# Patient Record
Sex: FEMALE | Race: WHITE | NOT HISPANIC OR LATINO | Employment: FULL TIME | URBAN - NONMETROPOLITAN AREA
[De-identification: names, ages, dates, MRNs, and addresses within clinical notes are randomized per-mention and may not be internally consistent; named-entity substitution may affect disease eponyms.]

---

## 2023-01-28 ENCOUNTER — APPOINTMENT (OUTPATIENT)
Dept: RADIOLOGY | Facility: CLINIC | Age: 53
End: 2023-01-28

## 2023-01-28 ENCOUNTER — OFFICE VISIT (OUTPATIENT)
Dept: URGENT CARE | Facility: CLINIC | Age: 53
End: 2023-01-28

## 2023-01-28 VITALS
DIASTOLIC BLOOD PRESSURE: 76 MMHG | WEIGHT: 132 LBS | HEIGHT: 62 IN | SYSTOLIC BLOOD PRESSURE: 112 MMHG | RESPIRATION RATE: 18 BRPM | OXYGEN SATURATION: 99 % | BODY MASS INDEX: 24.29 KG/M2 | HEART RATE: 100 BPM | TEMPERATURE: 97.7 F

## 2023-01-28 DIAGNOSIS — M77.8 LEFT HAND TENDONITIS: Primary | ICD-10-CM

## 2023-01-28 DIAGNOSIS — M79.642 LEFT HAND PAIN: ICD-10-CM

## 2023-01-28 RX ORDER — PANTOPRAZOLE SODIUM 40 MG/1
40 TABLET, DELAYED RELEASE ORAL DAILY
COMMUNITY
Start: 2022-11-18

## 2023-01-28 RX ORDER — RIMEGEPANT SULFATE 75 MG/75MG
75 TABLET, ORALLY DISINTEGRATING ORAL
COMMUNITY
Start: 2023-01-10

## 2023-01-28 RX ORDER — SUCRALFATE 1 G/1
1 TABLET ORAL 4 TIMES DAILY
COMMUNITY
Start: 2023-01-15

## 2023-01-28 RX ORDER — AMITRIPTYLINE HYDROCHLORIDE 75 MG/1
75 TABLET, FILM COATED ORAL EVERY EVENING
COMMUNITY
Start: 2022-11-18

## 2023-01-28 RX ORDER — DIPHENOXYLATE HYDROCHLORIDE AND ATROPINE SULFATE 2.5; .025 MG/1; MG/1
1 TABLET ORAL DAILY
COMMUNITY

## 2023-01-28 NOTE — PATIENT INSTRUCTIONS
Tylenol or ibuprofen for pain  Wear brace as instructed  Apply ice 4 times per day for at least 15 to 20 minutes  Use insulation to avoid frostbite  Place 1-2 pillows under left wrist/hand when resting for elevation  Follow up with PT as instructed  Follow up with PCP in 3-5 days  Proceed to  ER if symptoms worsen  Tendinitis   WHAT YOU NEED TO KNOW:   Tendinitis is painful inflammation or breakdown of your tendons  It may also be called tendinopathy  Tendinitis often occurs in the knee, shoulder, ankle, hip, or elbow  DISCHARGE INSTRUCTIONS:   Medicines:   Pain medicines  such as acetaminophen or NSAIDs may decrease swelling and pain or fever  These medicines are available without a doctor's order  Ask which medicine to take  Ask how much to take and when to take it  Follow directions  Acetaminophen and NSAIDs can cause liver or kidney damage if not taken correctly  If you take blood thinner medicine, always ask your healthcare provider if NSAIDs are safe for you  Always read the medicine label and follow the directions on it before using these medicine  Take your medicine as directed  Contact your healthcare provider if you think your medicine is not helping or if you have side effects  Tell him if you are allergic to any medicine  Keep a list of the medicines, vitamins, and herbs you take  Include the amounts, and when and why you take them  Bring the list or the pill bottles to follow-up visits  Carry your medicine list with you in case of an emergency  Management:   Rest  your tendon as directed to help it heal  Ask your healthcare provider if you need to stop putting weight on your affected area  Ice  helps decrease swelling and pain  Ice may also help prevent tissue damage  Use an ice pack, or put crushed ice in a plastic bag  Cover it with a towel and place it on the affected area for 10 to 15 minutes every hour or as directed      Support devices  such as a cane, splint, shoe insert, or brace may help reduce your pain  Physical therapy  may be ordered by your healthcare provider  This may be used to teach you exercises to help improve movement and strength, and to decrease pain  You may also learn how to improve your posture, and how to lift or exercise correctly  Prevention:   Stretch and warm up  before you exercise  Exercise regularly  to strengthen the muscles around your joint  Ease into an exercise routine for the first 3 weeks to prevent another injury  Ask your healthcare provider about the best exercise plan for you  Rest fully between activities  Use the right equipment  for sports and exercise  Wear braces or tape around weak joints as directed  Follow up with your healthcare provider as directed:  Write down your questions so you remember to ask them during your visits  Contact your healthcare provider if:   You have increased pain even after you take medicine  You have questions or concerns about your condition or care  Return to the emergency department if:   You have increased redness over the joint, or swelling in the joint  You suddenly cannot move your joint  © Copyright [employer] 2022 Information is for End User's use only and may not be sold, redistributed or otherwise used for commercial purposes  All illustrations and images included in CareNotes® are the copyrighted property of [employer]  or [facility] Christel Torres   The above information is an  only  It is not intended as medical advice for individual conditions or treatments  Talk to your doctor, nurse or pharmacist before following any medical regimen to see if it is safe and effective for you

## 2023-01-28 NOTE — PROGRESS NOTES
[facility] [facility]        NAME: Betty Flores is a 46 y o  female  : 1970    MRN: [id number]  DATE: 2023  TIME: 12:51 PM    Assessment and Plan   Left hand tendonitis [M77 8]  1  Left hand tendonitis  XR hand 3+ vw left    Ambulatory Referral to PT/OT Hand Therapy        X-ray did not reveal any obvious fractures  Official radiology reading pending at this time  If read changes, will reach out to patient  We will give her a wrist brace in the clinic today  Referring to PT/OT hand therapy  Advised patient that they will reach out to her  Advised to return or go to the ER symptoms worsen  Vies to follow-up with her PCP  Patient Instructions     Tylenol or ibuprofen for pain  Wear brace as instructed  Apply ice 4 times per day for at least 15 to 20 minutes  Use insulation to avoid frostbite  Place 1-2 pillows under left wrist/hand when resting for elevation  Follow up with PT as instructed  Follow up with PCP in 3-5 days  Proceed to  ER if symptoms worsen  Chief Complaint     Chief Complaint   Patient presents with   • Wrist Pain     Started about a month ago, denies any falls or trauma to area of left wrist  Pain is intermittent, awakens at night, sharp pain 6/10 radiates to upper forearm  Has taken otc tylenol and placed ice on it with minor relief  Active ADL, able to move hand and fingers with pain, worsens with activity  History of Present Illness       49-year-old female presents with left posterior hand pain that runs from the left thumb off of the middle left forearm  Symptoms began about a month ago  She denies any falls or trauma  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  History of carpal tunnel surgery on the left  Patient admits to some numbness/tingling in the left middle finger and left thumb  Pain is sharp and rated 6 out of 10  Tried Tylenol and ice with minimal relief  Pain with moving fingers    Denies any fever, chills, chest pain, shortness of breath, abdominal pain  Review of Systems   Review of Systems   Constitutional: Negative  HENT: Negative  Respiratory: Negative  Cardiovascular: Negative  Gastrointestinal: Negative  Musculoskeletal: Positive for arthralgias and joint swelling  Skin: Negative  Neurological: Negative  Current Medications       Current Outpatient Medications:   •  amitriptyline (ELAVIL) 75 mg tablet, Take 75 mg by mouth every evening, Disp: , Rfl:   •  Cholecalciferol 25 MCG (1000 UT) capsule, Take 1,000 Units by mouth daily, Disp: , Rfl:   •  cyanocobalamin (VITAMIN B-12) 1000 MCG tablet, Take by mouth, Disp: , Rfl:   •  fremanezumab-vfrm (Ajovy) 225 MG/1 5ML prefilled syringe, Inject 675 mg under the skin, Disp: , Rfl:   •  multivitamin (THERAGRAN) TABS, Take 1 tablet by mouth daily, Disp: , Rfl:   •  pantoprazole (PROTONIX) 40 mg tablet, Take 40 mg by mouth daily, Disp: , Rfl:   •  Rimegepant Sulfate (Nurtec) 75 MG TBDP, Take 75 mg by mouth, Disp: , Rfl:   •  sucralfate (CARAFATE) 1 g tablet, Take 1 g by mouth 4 (four) times a day, Disp: , Rfl:     Current Allergies     Allergies as of 01/28/2023 - Reviewed 01/28/2023   Allergen Reaction Noted   • Cefaclor Hives 03/22/2005   • Cephalexin Hives 03/22/2005   • Penicillins Hives 03/22/2005            The following portions of the patient's history were reviewed and updated as appropriate: allergies, current medications, past family history, past medical history, past social history, past surgical history and problem list      Past Medical History:   Diagnosis Date   • GERD (gastroesophageal reflux disease)    • Migraine        Past Surgical History:   Procedure Laterality Date   • ARTHROSCOPIC REPAIR ACL Right    • CHOLECYSTECTOMY     • TUBAL LIGATION N/A        No family history on file  Medications have been verified          Objective   /76   Pulse 100   Temp 97 7 °F (36 5 °C)   Resp 18   Ht 5' 2" (1 575 m) Wt 59 9 kg (132 lb)   SpO2 99%   BMI 24 14 kg/m²        Physical Exam     Physical Exam  Constitutional:       General: She is not in acute distress  Appearance: Normal appearance  HENT:      Head: Normocephalic and atraumatic  Eyes:      Extraocular Movements: Extraocular movements intact  Pupils: Pupils are equal, round, and reactive to light  Cardiovascular:      Rate and Rhythm: Normal rate and regular rhythm  Pulses: Normal pulses  Heart sounds: Normal heart sounds  No murmur heard  No gallop  Pulmonary:      Effort: Pulmonary effort is normal  No respiratory distress  Breath sounds: Normal breath sounds  No stridor  No wheezing, rhonchi or rales  Chest:      Chest wall: No tenderness  Musculoskeletal:      Right wrist: Normal       Left wrist: Tenderness (Mild tenderness to palpation, pain from left hand radiates into the left wrist and upper forearm ) present  No swelling, deformity, bony tenderness or snuff box tenderness  Normal pulse  Right hand: Normal       Left hand: Tenderness (Tenderness to palpation along the base of the left thumb that radiates into the left hand and up to the forearm  Pain is reproducible with palpation   strength 5 out of 5 bilaterally equal sensation in all digits bilaterally ) present  No swelling or deformity  Normal range of motion  Normal capillary refill  Normal pulse  Cervical back: Normal range of motion and neck supple  Skin:     General: Skin is warm and dry  Capillary Refill: Capillary refill takes less than 2 seconds  Findings: No rash  Neurological:      General: No focal deficit present  Mental Status: She is alert and oriented to person, place, and time     Psychiatric:         Mood and Affect: Mood normal          Behavior: Behavior normal

## 2023-02-13 ENCOUNTER — EVALUATION (OUTPATIENT)
Dept: PHYSICAL THERAPY | Facility: CLINIC | Age: 53
End: 2023-02-13

## 2023-02-13 DIAGNOSIS — M77.8 LEFT HAND TENDONITIS: ICD-10-CM

## 2023-02-13 NOTE — PROGRESS NOTES
PT Evaluation     Today's date: 2023  Patient name: Latanya Leach  : 1970  MRN: [id number]  Referring provider: TYSON Collazo*  Dx:   Encounter Diagnosis     ICD-10-CM    1  Left hand tendonitis  M77 8 Ambulatory Referral to PT/OT Hand Therapy                     Assessment  Assessment details: Pt is a 47 YO female presenting to PT with pain, decreased AROM, strength and tolerance to activity  Pt would benefit from skilled intervention to address these issues and maximize overall function  Occupation- [employer]i employee, full time, currently working  Dominant- right; Involved- left      Goals  ST  Decrease pain to 0-2/10 in 4 weeks            2  Decrease swelling left thumb and wrist            3  Increase AROM to [email or web address] in 6-8 weeks            4   Provide orthotic for protection  LT  Increase functional motion and strength for independence with ADL and self care by DC            2   Ability to RT recreational activity by DC    Plan  Patient would benefit from: skilled physical therapy  Planned modality interventions: cryotherapy, ultrasound and thermotherapy: hydrocollator packs  Planned therapy interventions: activity modification, neuromuscular re-education, manual therapy, therapeutic activities, stretching, therapeutic exercise, strengthening and home exercise program  Frequency: 2x week  Duration in weeks: 4  Treatment plan discussed with: patient        Subjective Evaluation    History of Present Illness  Mechanism of injury: Progressive pain, inability to use her left hand fully for work, ADL over past 2 months  Pain  Current pain ratin  At best pain ratin  At worst pain rating: 10    Hand dominance: right    Treatments  Current treatment: physical therapy  Patient Goals  Patient goals for therapy: decreased edema, decreased pain, increased motion, increased strength, independence with ADLs/IADLs and return to sport/leisure activities          Objective     General Comments:      Wrist/Hand Comments  AROM left wrist E/F- 60/60- limited by pain                    Thumb MP- 0/65; IP- 0/75; opp- -1 0- limited by pain  Strength-  II- L/R- 35/40; 3 SANDEEP- 1/3; Key- 3/5- all painful left  Circumference at wrist- 14 5 cm  Palpation- tender ECR, EPL/APL  (+) Finkelstein  Pt wearing a SA wrist orthosis when active             Precautions: avoid provocative activity, position      Manuals 2/13            STM 15                                                   Neuro Re-Ed             HP/pulsed biph 15                                      Ther Ex                                                                                                                                                                                                   Modalities             US 15            CP 15

## 2023-02-16 ENCOUNTER — OFFICE VISIT (OUTPATIENT)
Dept: PHYSICAL THERAPY | Facility: CLINIC | Age: 53
End: 2023-02-16

## 2023-02-16 DIAGNOSIS — M77.8 LEFT HAND TENDONITIS: Primary | ICD-10-CM

## 2023-02-21 ENCOUNTER — OFFICE VISIT (OUTPATIENT)
Dept: PHYSICAL THERAPY | Facility: CLINIC | Age: 53
End: 2023-02-21

## 2023-02-21 DIAGNOSIS — M77.8 LEFT HAND TENDONITIS: Primary | ICD-10-CM

## 2023-02-21 NOTE — PROGRESS NOTES
Daily Note     Today's date: 2023  Patient name: Brenna Maher  : 1970  MRN: [id number]  Referring provider: TYSON Frias*  Dx:   Encounter Diagnosis     ICD-10-CM    1  Left hand tendonitis  M77 8                      Subjective: Pt reports her pain continues along dorsal and volar thumb proximal into forearm  "It still hurts at work when using it although I am using brace "      Objective: See treatment diary below  Objective: See treatment diary below    AROM left wrist E/F- 60/60- limited by pain                    Thumb MP- 0/65; IP- 0/75; opp- -1 0- limited by pain  Strength-  II- L/R- 35/40; 3 SANDEEP- /3; Key- 3/5- all painful left  Circumference at wrist- 14 5 cm  Palpation- tender ECR, EPL/APL  (+) Finkelstein  Pt wearing a SA wrist orthosis when active      Assessment: Tolerated treatment well today  Continue with current program while monitoring symptoms  [facility] if symptoms decrease  Plan: Progress treatment as tolerated         Precautions: avoid provocative activity, position      Manuals           STM 15 15 15                                                 Neuro Re-Ed             HP/pulsed biph 15 15 15                                    Ther Ex                                                                                                                                                                                                   Modalities             US 15 15 15          CP 15 15 DEF

## 2023-02-23 ENCOUNTER — OFFICE VISIT (OUTPATIENT)
Dept: PHYSICAL THERAPY | Facility: CLINIC | Age: 53
End: 2023-02-23

## 2023-02-23 DIAGNOSIS — M77.8 LEFT HAND TENDONITIS: Primary | ICD-10-CM

## 2023-02-23 NOTE — PROGRESS NOTES
Daily Note     Today's date: 2023  Patient name: Mae Ahuja  : 1970  MRN: [id number]  Referring provider: TYSON Bowden*  Dx: No diagnosis found  Encounter Diagnosis     ICD-10-CM    1  Left hand tendonitis  M77 8                  Subjective: Pt felt relief post session but did not sustain over the next few days  Objective: See treatment diary below  Objective: See treatment diary below    AROM left wrist E/F- 60/60- limited by pain                    Thumb MP- 0/65; IP- 0/75; opp- -1 0- limited by pain  Strength-  II- L/R- 35/40; 3 SANDEEP- 1/3; Key- 3/5- all painful left  Circumference at wrist- 14 5 cm  Palpation- tender ECR, EPL/APL  (+) Finkelstein  Pt wearing a SA wrist orthosis when active      Assessment: Tolerated treatment well today  Pt issued [employer] 16 for work function  Plan: Progress treatment as tolerated                      Manuals          STM 15 15 15 15                      CMC comfort    Med                      Neuro Re-Ed             HP/pulsed biph 15 15 15 15                                   Ther Ex                                                                                                                                                                                                   Modalities             US 15 15 15 15         CP 15 15 DEF DEF              P

## 2023-03-02 ENCOUNTER — OFFICE VISIT (OUTPATIENT)
Dept: PHYSICAL THERAPY | Facility: CLINIC | Age: 53
End: 2023-03-02

## 2023-03-02 DIAGNOSIS — M77.8 LEFT HAND TENDONITIS: Primary | ICD-10-CM

## 2023-03-02 NOTE — PROGRESS NOTES
Daily Note     Today's date: 3/2/2023  Patient name: Karolyn Go  : 1970  MRN: [id number]  Referring provider: TYSON Basurto*  Dx: No diagnosis found  Subjective: Pt reports "my thumb is a tad better " "I like the [facility] glove "      Objective: See treatment diary below    Objective: See treatment diary below    AROM left wrist E/F- 60/60- limited by pain                    Thumb MP- 0/65; IP- 0/75; opp- -1 0- limited by pain  Strength-  II- L/R- 35/40; 3 SANDEEP- 1/3; Key- 3/5- all painful left  Circumference at wrist- 14 5 cm  Palpation- tender ECR, EPL/APL  (+) Finkelstein  Pt wearing a SA wrist orthosis when active      Assessment: Tolerated treatment well today  Continue with current program  CMC comfort cool responding well  Plan: Progress treatment as tolerated                    Manuals 2/13 2/16 2/21 2/23 3/2        STM 15 15 15 15 15                     CMC comfort    Med                      Neuro Re-Ed             HP/pulsed biph 15 15 15 15 15                                  Ther Ex                                                                                                                                                                                                   Modalities             US 15 15 15 15 15        CP 15 15 DEF DEF

## 2023-03-07 ENCOUNTER — OFFICE VISIT (OUTPATIENT)
Dept: PHYSICAL THERAPY | Facility: CLINIC | Age: 53
End: 2023-03-07

## 2023-03-07 DIAGNOSIS — M77.8 LEFT HAND TENDONITIS: Primary | ICD-10-CM

## 2023-03-07 NOTE — PROGRESS NOTES
Daily Note     Today's date: 3/7/2023  Patient name: Dontae Claros  : 1970  MRN: [id number]  Referring provider: TYSON Bello*  Dx:   Encounter Diagnosis     ICD-10-CM    1  Left hand tendonitis  M77 8                      Subjective: pt notes less soreness with [facility] support      Objective: See treatment diary below  AROM left wrist E/F- 60/60- limited by pain                    Thumb MP- 0/65; IP- 0/75; opp- -1 0- limited by pain  Strength-  II- L/R- 35/40; 3 SANDEEP- 1/3; Key- 3/5- all painful left  Circumference at wrist- 14 5 cm  Palpation- tender ECR, EPL/APL  (+) Finkelstein  Pt wearing a SA wrist orthosis when active    Assessment: Tolerated treatment well  Patient would benefit from continued PT      Plan: Continue per plan of care                   Manuals 2/13 2/16 2/21 2/23 3/2 3/7       STM 15 15 15 15 15 15                    CMC comfort    Med  m                    Neuro Re-Ed             HP/pulsed biph 15 15 15 15 15 15                                 Ther Ex                                                                                                                                                                                                   Modalities             US 15 15 15 15 15 15       CP 15 15 DEF DEF  --

## 2023-03-09 ENCOUNTER — OFFICE VISIT (OUTPATIENT)
Dept: PHYSICAL THERAPY | Facility: CLINIC | Age: 53
End: 2023-03-09

## 2023-03-09 DIAGNOSIS — M77.8 LEFT HAND TENDONITIS: Primary | ICD-10-CM

## 2023-03-09 NOTE — PROGRESS NOTES
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, full time, currently working  Dominant- right; Involved- left  Pt notes reduced pain following therapy, but increased activity level increases radial thumb pain  She is wearing a comfort cool spica during the daytime      Goals  ST  Decrease pain to 0-2/10 in 4 weeks            2  Decrease swelling left thumb and wrist            3  Increase AROM to ROGE/[employer] [facility] [employer] [facility] in 6-8 weeks            4   Provide orthotic for protection  LT  Increase functional motion and strength for independence with ADL and self care by DC            2   Ability to RT recreational activity by DC    Plan  Patient would benefit from: skilled physical therapy  Planned modality interventions: cryotherapy, ultrasound and thermotherapy: hydrocollator packs  Planned therapy interventions: activity modification, neuromuscular re-education, manual therapy, therapeutic activities, stretching, therapeutic exercise, strengthening and home exercise program  Frequency: 2x week  Duration in weeks: 4  Treatment plan discussed with: patient        Subjective Evaluation    History of Present Illness  Mechanism of injury: Progressive pain, inability to use her left hand fully for work, ADL over past 2 months  Pain  Current pain ratin  At best pain ratin  At worst pain ratin    Hand dominance: right    Treatments  Current treatment: physical therapy  Patient Goals  Patient goals for therapy: decreased edema, decreased pain, increased motion, increased strength, independence with ADLs/IADLs and return to sport/leisure activities          Objective     General Comments:      Wrist/Hand Comments  AROM left wrist E/F- 60/60- limited by pain                    Thumb MP- 0/65; IP- 0/75; opp- -1 0- limited by pain  Strength-  II- L/R- 32/45; 3 SANDEEP- 4/4; Key- 5/5- all painful left  Circumference at wrist- 14 5 cm  Palpation- tender ECR, EPL/APL  (+) Finkelstein  Pt wearing a SA wrist orthosis when active             Precautions: avoid provocative activity, position    Manuals 2/13 2/16 2/21 2/23 3/2 3/7  3/9         STM 15 15 15 15 15 15  15                                 CMC comfort       Med   m                                   Neuro Re-Ed                       HP/pulsed biph 15 15 15 15 15 15  15                                                         Ther Ex                                                                                                                                                                                                                                                                                                                                                                       Modalities                       US 15 15 15 15 15 15  15         CP 15 15 DEF DEF   -- ----

## 2023-03-09 NOTE — LETTER
2023    Rodolfo Cline PA-C  [address] [facility] [address]    Patient: Brett Messina   YOB: 1970   Date of Visit: 3/9/2023     Encounter Diagnosis     ICD-10-CM    1  Left hand tendonitis  M77 8           Dear Dr Sakshi Cheng: Thank you for your recent referral of Brett Messina  Please review the attached evaluation summary from Marsha's recent visit  Please verify that you agree with the plan of care by signing the attached order  If you have any questions or concerns, please do not hesitate to call  I sincerely appreciate the opportunity to share in the care of one of your patients and hope to have another opportunity to work with you in the near future  Sincerely,    Corinne Martini, MARYT,CHT    Referring Provider:      I certify that I have read the below Plan of Care and certify the need for these services furnished under this plan of treatment while under my care  Rodolfo Cline PA-C  [address] [facility] [address]  Via Fax: [phone]          PT Re-Evaluation     Today's date: 3/9/2023  Patient name: Brett Messina  : 1970  MRN: [id number]  Referring provider: TYSON Lantigua*  Dx:   Encounter Diagnosis     ICD-10-CM    1  Left hand tendonitis  M77 8                      Assessment  Assessment details: Pt is a 47 YO female presenting to PT with pain, decreased AROM, strength and tolerance to activity  Pt would benefit from skilled intervention to address these issues and maximize overall function  Occupation- [employer] employee, full time, currently working  Dominant- right; Involved- left  Pt notes reduced pain following therapy, but increased activity level increases radial thumb pain  She is wearing a comfort cool spica during the daytime      Goals  ST  Decrease pain to 0-2/10 in 4 weeks            2  Decrease swelling left thumb and wrist            3    Increase AROM to [facility] in 6-8 weeks            4   Provide orthotic for protection  LT  Increase functional motion and strength for independence with ADL and self care by DC            2   Ability to RT recreational activity by DC    Plan  Patient would benefit from: skilled physical therapy  Planned modality interventions: cryotherapy, ultrasound and thermotherapy: hydrocollator packs  Planned therapy interventions: activity modification, neuromuscular re-education, manual therapy, therapeutic activities, stretching, therapeutic exercise, strengthening and home exercise program  Frequency: 2x week  Duration in weeks: 4  Treatment plan discussed with: patient        Subjective Evaluation    History of Present Illness  Mechanism of injury: Progressive pain, inability to use her left hand fully for work, ADL over past 2 months  Pain  Current pain ratin  At best pain ratin  At worst pain ratin    Hand dominance: right    Treatments  Current treatment: physical therapy  Patient Goals  Patient goals for therapy: decreased edema, decreased pain, increased motion, increased strength, independence with ADLs/IADLs and return to sport/leisure activities          Objective     General Comments:      Wrist/Hand Comments  AROM left wrist E/F- 60/60- limited by pain                    Thumb MP- 0/65; IP- 0/75; opp- -1 0- limited by pain  Strength-  II- L/R- 32/45; 3 SANDEEP- 4/4; Key- 5/5- all painful left  Circumference at wrist- 14 5 cm  Palpation- tender ECR, EPL/APL  (+) Finkelstein  Pt wearing a SA wrist orthosis when active            Precautions: avoid provocative activity, position    Manuals  3 3  3/         STM 15 15 15 15 15 15  15                                 [facility] comfort       Med   m                                   Neuro Re-Ed                       HP/pulsed biph 15 15 15 15 15 15  15                                                         Ther Ex                                                                                                                                                                                                                                                                                                                                                                       Modalities                       US 15 15 15 15 15 15  15         CP 15 15 DEF DEF   -- ----

## 2023-03-14 ENCOUNTER — APPOINTMENT (OUTPATIENT)
Dept: PHYSICAL THERAPY | Facility: CLINIC | Age: 53
End: 2023-03-14

## 2023-03-16 ENCOUNTER — OFFICE VISIT (OUTPATIENT)
Dept: PHYSICAL THERAPY | Facility: CLINIC | Age: 53
End: 2023-03-16

## 2023-03-16 DIAGNOSIS — M77.8 LEFT HAND TENDONITIS: Primary | ICD-10-CM

## 2023-03-16 NOTE — PROGRESS NOTES
Daily Note     Today's date: 3/16/2023  Patient name: Karolyn Go  : 1970  MRN: [id number]  Referring provider: TYSON Basurto*  Dx:   Encounter Diagnosis     ICD-10-CM    1  Left hand tendonitis  M77 8                      Subjective: pt notes dorsal hand and arm pain today following work  She has been wearing her soft comfort cool with relief of pain  Objective: See treatment diary below    AROM left wrist E/F- 60/60- limited by pain                    Thumb MP- 0/65; IP- 0/75; opp- -1 0- limited by pain  Strength-  II- L/R- 32/45; 3 SANDEEP- 4/4; Key- 5/5- all painful left  Circumference at wrist- 14 5 cm  Palpation- tender ECR, EPL/APL  (+) Finkelstein  Pt wearing a SA wrist orthosis when active    Assessment: Tolerated treatment well  Patient would benefit from continued PT      Plan: Continue per plan of care        Precautions: avoid provocative activity, position    Manuals  3/2 3/7  3/9  3/16       STM 15 15 15 15 15 15  15  15                               [facility] comfort       Med   m                                   Neuro Re-Ed                       HP/pulsed biph 15 15 15 15 15 15  15  15                                                       Ther Ex                                                                                                                                                                                                                                                                                                                                                                       Modalities                       US 15 15 15 15 15 15  15  15       CP 15 15 DEF DEF   -- ----

## 2023-03-21 ENCOUNTER — OFFICE VISIT (OUTPATIENT)
Dept: PHYSICAL THERAPY | Facility: CLINIC | Age: 53
End: 2023-03-21

## 2023-03-21 DIAGNOSIS — M77.8 LEFT HAND TENDONITIS: Primary | ICD-10-CM

## 2023-03-21 NOTE — PROGRESS NOTES
Daily Note     Today's date: 3/21/2023  Patient name: Mae Ahuja  : 1970  MRN: [id number]  Referring provider: TYSON Bowden*  Dx:   Encounter Diagnosis     ICD-10-CM    1  Left hand tendonitis  M77 8                      Subjective: Pt reports her thumb is getting "somewhat better" Rotating out my brace and CMC   Objective: See treatment diary below  Objective: See treatment diary below    AROM left wrist E/F- 60/60- limited by pain                    Thumb MP- 0/65; IP- 0/75; opp- -1 0- limited by pain  Strength-  II- L/R- 32/45; 3 SANDEEP- 4/4; Key- /- all painful left  Circumference at wrist- 14 5 cm  Palpation- tender ECR, EPL/APL  (+) Finkelstein  Pt wearing a SA wrist orthosis when active        Assessment: Tolerated treatment well today   Continue with current program   Plan: Continue with current program      Precautions: avoid provocative activity, position    Manuals  3/2 3/7  3/9  3/16  3/21     STM 15 15 15 15 15 15  15  15  15                             CMC comfort       Med   m                                   Neuro Re-Ed                       HP/pulsed biph 15 15 15 15 15 15  15  15  15                                                     Ther Ex                                                                                                                                                                                                                                                                                                                                                                       Modalities                       US 15 15 15 15 15 15  15  15  15     CP 15 15 DEF DEF   -- ----

## 2023-03-23 ENCOUNTER — OFFICE VISIT (OUTPATIENT)
Dept: PHYSICAL THERAPY | Facility: CLINIC | Age: 53
End: 2023-03-23

## 2023-03-23 DIAGNOSIS — M77.8 LEFT HAND TENDONITIS: Primary | ICD-10-CM

## 2023-03-23 NOTE — PROGRESS NOTES
Daily Note     Today's date: 3/23/2023  Patient name: Rodolfo Dempsye  : 1970  MRN: [id number]  Referring provider: TYSON Mccloud*  Dx:   Encounter Diagnosis     ICD-10-CM    1  Left hand tendonitis  M77 8                      Subjective: Pt reports her thumb was improving but did "something at work with it and felt an intermittent twinge "      Objective: See treatment diary below    Objective: See treatment diary below    AROM left wrist E/F- 60/60- limited by pain                    Thumb MP- 0/65; IP- 0/75; opp- -1 0- limited by pain  Strength-  II- L/R- 32/45; 3 SANDEEP- 4/4; Key- 5/5- all painful left  Circumference at wrist- 14 5 cm  Palpation- tender ECR, EPL/APL  (+) Finkelstein  Pt wearing a SA wrist orthosis when active    Assessment: Tolerated treatment well today  Continue with current program to dissipate symptoms        Plan: Continue with current program      Precautions: avoid provocative activity, position    Manuals  3 3  3/9  3/16  3/21  3/23   STM 15 15 15 15 15 15  15  15  15  15                           [facility] comfort       Med   m                                   Neuro Re-Ed                       HP/pulsed biph 15 15 15 15 15 15  15  15  15  15                                                   Ther Ex                                                                                                                                                                                                                                                                                                                                                                       Modalities                       US 15 15 15 15 15 15  15  15  15  15   CP 15 15 DEF DEF   -- ----

## 2023-03-28 ENCOUNTER — OFFICE VISIT (OUTPATIENT)
Dept: PHYSICAL THERAPY | Facility: CLINIC | Age: 53
End: 2023-03-28

## 2023-03-28 DIAGNOSIS — M77.8 LEFT HAND TENDONITIS: Primary | ICD-10-CM

## 2023-03-28 NOTE — PROGRESS NOTES
"Daily Note     Today's date: 3/28/2023  Patient name: Kay Camacho  : 1970  MRN: [id number]  Referring provider: TYSON Aquino*  Dx:   Encounter Diagnosis     ICD-10-CM    1  Left hand tendonitis  M77 8                      Subjective: Pt reports that \"my thumb seems a little better  \" I did slam it over the weekend but it didn't hurt after that  \"      Objective: See treatment diary below    Objective: See treatment diary below    AROM left wrist E/F- 60/60- limited by pain                    Thumb MP- 0/65; IP- 0/75; opp- -1 0- limited by pain  Strength-  II- L/R- 32/45; 3 SANDEEP- /; Key- - all painful left  Circumference at wrist- 14 5 cm  Palpation- tender ECR, EPL/APL  (+) Finkelstein  Pt wearing a SA wrist orthosis when active    Assessment: Tolerated treatment well today   Continue with current program   Plan: Continue with current program     Precautions: avoid provocative activity, position    Manuals 3/28 2/16 2/21 2/23 3/2 3/7  3/9  3/16  3/21  3/23   STM 15 15 15 15 15 15  15  15  15  15                           [facility] comfort       Med   m                                   Neuro Re-Ed                       HP/pulsed biph 15 15 15 15 15 15  15  15  15  15                                                   Ther Ex                                                                                                                                                                                                                                                                                                                                                                       Modalities                       US 15 15 15 15 15 15  15  15  15  15   CP 15 15 DEF DEF   -- ----                        "

## 2023-03-30 ENCOUNTER — APPOINTMENT (OUTPATIENT)
Dept: PHYSICAL THERAPY | Facility: CLINIC | Age: 53
End: 2023-03-30

## 2023-04-04 ENCOUNTER — APPOINTMENT (OUTPATIENT)
Dept: PHYSICAL THERAPY | Facility: CLINIC | Age: 53
End: 2023-04-04

## 2023-04-04 ENCOUNTER — OFFICE VISIT (OUTPATIENT)
Dept: PHYSICAL THERAPY | Facility: CLINIC | Age: 53
End: 2023-04-04

## 2023-04-04 DIAGNOSIS — M77.8 LEFT HAND TENDONITIS: Primary | ICD-10-CM

## 2023-04-04 NOTE — PROGRESS NOTES
PT Re-Evaluation     Today's date: 2023  Patient name: Onita Bloch  : 1970  MRN: [id number]  Referring provider: TYSON Mcconnell*  Dx:   Encounter Diagnosis     ICD-10-CM    1  Left hand tendonitis  M77 8                      Assessment  Assessment details: Pt is a 45 YO female presenting to PT with pain, decreased AROM, strength and tolerance to activity  Pt would benefit from skilled intervention to address these issues and maximize overall function  Occupation- [employer] employee, full time, currently working  Dominant- right; Involved- left  Pt notes reduced pain following therapy, but increased activity level increases radial thumb pain  She is wearing a comfort cool spica during the daytime      Goals  ST  Decrease pain to 0-2/10 in 4 weeks            2  Decrease swelling left thumb and wrist            3  [facility] in 6-8 weeks            4   Provide orthotic for protection  LT  Increase functional motion and strength for independence with ADL and self care by DC            2   Ability to RT recreational activity by DC    Plan  Patient would benefit from: skilled physical therapy  Planned modality interventions: cryotherapy, ultrasound and thermotherapy: hydrocollator packs  Planned therapy interventions: activity modification, neuromuscular re-education, manual therapy, therapeutic activities, stretching, therapeutic exercise, strengthening and home exercise program  Frequency: 2x week  Duration in weeks: 4  Treatment plan discussed with: patient        Subjective Evaluation    History of Present Illness  Mechanism of injury: Progressive pain, inability to use her left hand fully for work, ADL over past 2 months  Pain  Current pain ratin  At best pain ratin  At worst pain rating: 3    Hand dominance: right    Treatments  Current treatment: physical therapy  Patient Goals  Patient goals for therapy: decreased edema, decreased pain, increased motion, increased strength, independence with ADLs/IADLs and return to sport/leisure activities          Objective     General Comments:      Wrist/Hand Comments  AROM left wrist E/F- 60/60- limited by pain                    Thumb MP- 0/65; IP- 0/75; opp- -1 0- limited by pain  Strength-  II- L/R- 32/45; 3 SANDEEP- 4/4; Key- 5/5- all painful left  Circumference at wrist- 14 5 cm  Palpation- tender ECR, EPL/APL  (+) Finkelstein  Pt wearing a SA wrist orthosis when active             Precautions: avoid provocative activity, position    Manuals 3/28 4/4           STM 15 12 15 15 15 15  15  15  15  15                           [facility] comfort       Med   m                                   Neuro Re-Ed                       HP/pulsed biph 15 15 15 15 15 15  15  15  15  15                                                   Ther Ex                                                                                                                       Modalities                       US 15 12 15 15 15 15  15  15  15  15   CP 15 15 DEF DEF   -- ----

## 2023-04-06 ENCOUNTER — OFFICE VISIT (OUTPATIENT)
Dept: PHYSICAL THERAPY | Facility: CLINIC | Age: 53
End: 2023-04-06

## 2023-04-06 DIAGNOSIS — M77.8 LEFT HAND TENDONITIS: Primary | ICD-10-CM

## 2023-04-06 NOTE — PROGRESS NOTES
Daily Note     Today's date: 2023  Patient name: Abisai Christina  : 1970  MRN: [id number]  Referring provider: TYSON Castro*  Dx:   Encounter Diagnosis     ICD-10-CM    1  Left hand tendonitis  M77 8                      Subjective: Pt reports her thumb is improving with each visit  Objective: See treatment diary below  Wrist/Hand Comments  AROM left wrist E/F- 60/60- limited by pain                    Thumb MP- 0/65; IP- 0/75; opp- -1 0- limited by pain  Strength-  II- L/R- 32/45; 3 SANDEEP- ; Key- /- all painful left  Circumference at wrist- 14 5 cm  Palpation- tender ECR, EPL/APL  (+) Finkelstein  Pt wearing a SA wrist orthosis when active      Assessment: Tolerated treatment well today  Continue with current program       Plan: Progress treatment as tolerated         Precautions: avoid provocative activity, position    Manuals 3/28 4/4 4/6          STM 15 12 15 15 15 15  15  15  15  15                           [facility] comfort       Med   m                                   Neuro Re-Ed                       HP/pulsed biph 15 15 15 15 15 15  15  15  15  15                                                   Ther Ex                                                                                                                       Modalities                       US 15 12 15 15 15 15  15  15  15  15   CP 15 15 DEF DEF   -- ----

## 2023-04-13 ENCOUNTER — APPOINTMENT (OUTPATIENT)
Dept: PHYSICAL THERAPY | Facility: CLINIC | Age: 53
End: 2023-04-13

## 2023-04-25 ENCOUNTER — OFFICE VISIT (OUTPATIENT)
Dept: PHYSICAL THERAPY | Facility: CLINIC | Age: 53
End: 2023-04-25

## 2023-04-25 DIAGNOSIS — M77.8 LEFT HAND TENDONITIS: Primary | ICD-10-CM

## 2023-04-25 NOTE — PROGRESS NOTES
PT Re-Evaluation     Today's date: 2023  Patient name: Ronnie Perkins  : 1970  MRN: [id number]  Referring provider: TYSON Carter*  Dx:   Encounter Diagnosis     ICD-10-CM    1  Left hand tendonitis  M77 8                      Assessment  Assessment details: Pt is a 45 YO female presenting to PT with pain, decreased AROM, strength and tolerance to activity  Pt would benefit from skilled intervention to address these issues and maximize overall function  Occupation- deli employee, full time, currently working  Dominant- right; Involved- left  Pt notes reduced pain following therapy, but increased activity level increases radial thumb pain  She is wearing a comfort cool spica during the daytime and has notes fewer episodes of pain/soreness as she becomes stronger    Goals  ST  Decrease pain to 0-2/10 in 4 weeks            2  Decrease swelling left thumb and wrist            3  [facility] in 6-8 weeks            4   Provide orthotic for protection  LT  Increase functional motion and strength for independence with ADL and self care by DC            2   Ability to RT recreational activity by DC    Plan  Patient would benefit from: skilled physical therapy  Planned modality interventions: cryotherapy, ultrasound and thermotherapy: hydrocollator packs  Planned therapy interventions: activity modification, neuromuscular re-education, manual therapy, therapeutic activities, stretching, therapeutic exercise, strengthening and home exercise program  Frequency: 2x week  Duration in weeks: 4  Treatment plan discussed with: patient        Subjective Evaluation    History of Present Illness  Mechanism of injury: Progressive pain, inability to use her left hand fully for work, ADL over past 2 months  Pain  Current pain ratin  At best pain ratin  At worst pain ratin    Hand dominance: right    Treatments  Current treatment: physical therapy  Patient Goals  Patient goals for therapy: decreased edema, decreased pain, increased motion, increased strength, independence with ADLs/IADLs and return to sport/leisure activities          Objective     General Comments:      Wrist/Hand Comments  AROM left wrist E/F- 60/60- limited by pain                    Thumb MP- 0/65; IP- 0/75; opp- -1 0- limited by pain  Strength-  II- L/R- 38/45; 3 SANDEEP- 5/4; Key- 5/5- all painful left  Circumference at wrist- 14 5 cm  Palpation- tender ECR, EPL/APL  (+) Finkelstein  Pt wearing a SA wrist orthosis when active             Precautions: avoid provocative activity, position    Manuals 3/28 4/4 4/6 4/11 4/18 4/20  4/24         STM 15 12 15 15 15 15  15  15  15  15                           [facility] comfort       Med prev   m                                   Neuro Re-Ed                       HP/pulsed biph 15 15 15 15 15 15  15  15  15  15                                                   Ther Ex                        TP         P 2'  P 2'  p2'  Y 2'          TP 5fingerpull        P2/10  P 2/10  P2/10  Y 2/10          Varigrip E/F        0% 2/10  0% 2/10  50% 2/10  100% 2/10         TP nibs       T13x  T 13x T13x  T 13         Flexbar       Y 20/20 Y 20/20 Y 20/20  R 20/20         DB E/F          3 2/10 3 2/10  4 2/10                                                                                   Comp         Y 3'       Donuts               Y 2'         Modalities                       US 15 12 15 15 15 15 dc dc dc dc   CP 15 15 DEF DEF   -- ----

## 2023-04-27 ENCOUNTER — OFFICE VISIT (OUTPATIENT)
Dept: PHYSICAL THERAPY | Facility: CLINIC | Age: 53
End: 2023-04-27

## 2023-04-27 DIAGNOSIS — M77.8 LEFT HAND TENDONITIS: Primary | ICD-10-CM

## 2023-04-27 NOTE — PROGRESS NOTES
Daily Note     Today's date: 2023  Patient name: Shima Stoner  : 1970  MRN: [id number]  Referring provider: TYSON Jackson*  Dx:   Encounter Diagnosis     ICD-10-CM    1  Left hand tendonitis  M77 8                      Subjective: pt feels she is getting stronger with no increase in pain after increasing resistance      Objective: See treatment diary below    AROM left wrist E/F- 60/60- limited by pain                    Thumb MP- 0/65; IP- 0/75; opp- -1 0- limited by pain  Strength-  II- L/R- 38/45; 3 SANDEEP- 5/4; Key- /- all painful left  Circumference at wrist- 14 5 cm  Palpation- tender ECR, EPL/APL  (+) Finkelstein  Pt wearing a SA wrist orthosis when active     Assessment: Tolerated treatment well  Patient would benefit from continued PT      Plan: Continue per plan of care        Precautions: avoid provocative activity, position    Manuals 3/28 4/4 4/6 4/11 4/18 4/20  4/25  4/27       STM 15 12 15 15 15 15  15  15  15  15                           [facility] comfort       Med prev   m                                   Neuro Re-Ed                       HP/pulsed biph 15 15 15 15 15 15  15  15  15  15                                                   Ther Ex                        TP         P 2'  P 2'  p2'  Y 2'  Y 2'        TP 5fingerpull        P2/10  P 2/10  P2/10  Y 2/10  Y 2/10        Varigrip E/F        0% /10  0% 2/10  50% 2/10  100% /10  100% /10       TP nibs       T13x  T 13x T13x  T 13  T 13  Y     Flexbar       Y 20/20 Y 20/20 Y 20/20  R 20/20  R 20/20       DB E/F          3 2/10 3 2/10  4 2/10  4 2/10                                                                                 Comp         Y 3' Y 3'      Donuts               Y 2'  Y 2'       Modalities                       US 15 12 15 15 15 15 dc dc dc dc   CP 15 15 DEF DEF   -- ----             
no deformity, pain or tenderness, no restriction of movement

## 2023-05-02 ENCOUNTER — OFFICE VISIT (OUTPATIENT)
Dept: PHYSICAL THERAPY | Facility: CLINIC | Age: 53
End: 2023-05-02

## 2023-05-02 DIAGNOSIS — M77.8 LEFT HAND TENDONITIS: Primary | ICD-10-CM

## 2023-05-02 NOTE — PROGRESS NOTES
Daily Note     Today's date: 2023  Patient name: Alcon Wynn  : 1970  MRN: [id number]  Referring provider: Christy Mehta MD  Dx:   Encounter Diagnosis     ICD-10-CM    1  Left hand tendonitis  M77 8                      Subjective: Pt reports her thumb is coming around  Certain activities create pain for my thumb  Objective: See treatment diary below  AROM left wrist E/F- 60/60- limited by pain                    Thumb MP- 0/65; IP- 0/75; opp- -1 0- limited by pain  Strength-  II- L/R- 38/45; 3 SANDEEP- /; Key- - all painful left  Circumference at wrist- 14 5 cm  Palpation- tender ECR, EPL/APL  (+) Finkelstein  Pt wearing a SA wrist orthosis when active  Assessment: Tolerated treatment well today, Pt with minimal complaints during session and exhibits good technique during session  Progression made without complaint  Plan: Progress treatment as tolerated         Precautions: avoid provocative activity, position    Manuals 3/28 4/4 4/6 4/11 4/18 4/20  4/25  4/27  5     STM 15 12 15 15 15 15  15  15  15  15                           CMC comfort       Med prev   m                                   Neuro Re-Ed                       HP/pulsed biph 15 15 15 15 15 15  15  15  15  15                                                   Ther Ex                        TP         P 2'  P 2'  p2'  Y 2'  Y 2'  Y2'      TP 5fingerpull        P2/10  P 2/10  P2/10  Y 2/10  Y 2/10  Y /10      Varigrip E/F        0% /10  0% /10  50% /10  100% /10  100% /10  100% /10     TP nibs       T13x  T 13x T13x  T 13  T 13  YY13     Flexbar       Y 20/20 Y 20/20 Y 20/20  R 20/20  R 20/20  R 2/10     DB E/F          3 2/10 3 2/10  4 2/10  4 2/10  4 2/10                                                                               Comp         Y 3' Y 3' Y3'     Donuts               Y 2'  Y 2'  Y2'     Modalities                       US 15 12 15 15 15 15 dc dc dc dc   CP 15 15 DEF DEF   -- ----    DEF

## 2023-05-09 ENCOUNTER — OFFICE VISIT (OUTPATIENT)
Dept: PHYSICAL THERAPY | Facility: CLINIC | Age: 53
End: 2023-05-09

## 2023-05-09 DIAGNOSIS — M77.8 LEFT HAND TENDONITIS: Primary | ICD-10-CM

## 2023-05-09 NOTE — PROGRESS NOTES
"Daily Note     Today's date: 2023  Patient name: Lincoln Horne  : 1970  MRN: [id number]  Referring provider: Mainor Parisi MD  Dx:   Encounter Diagnosis     ICD-10-CM    1  Left hand tendonitis  M77 8                      Subjective: Pt reports she was on vac and did not wear her [facility] comfort cool  \"I didn't have any problems  \"      Objective: See treatment diary below  AROM left wrist E/F- 60/60- limited by pain                    Thumb MP- 0/65; IP- 0/75; opp- -1 0- limited by pain  Strength-  II- L/R- 38/45; 3 SANDEEP- 5/4; Key- /- all painful left  Circumference at wrist- 14 5 cm  Palpation- tender ECR, EPL/APL  (+) Finkelstein  Pt wearing a SA wrist orthosis when active      Assessment: Tolerated treatment well today  Increased function at work with less symptoms        Plan: Continue with current program      Precautions: avoid provocative activity, position    Manuals 3/28 4/4 4/6 4/11 4/18 4/20  4/25  4/27  5/2  5/9   STM 15 12 15 15 15 15  15  15  15  15                           CMC comfort       Med prev   m                                   Neuro Re-Ed                       HP/pulsed biph 15 15 15 15 15 15  15  15  15  15                                                   Ther Ex                        TP         P 2'  P 2'  p2'  Y 2'  Y 2'  Y2'  Y2'    TP 5fingerpull        P2/10  P 2/10  P2/10  Y 2/10  Y 2/10  Y 2/10  Y 2/10    Varigrip E/F        0% /10  0% /10  50% /10  100% 2/10  100% 2/10  100% 2/10  100% /10   TP nibs       T13x  T 13x T13x  T 13  T 13  YY13  Y13x   Flexbar       Y 20/20 Y 20/20 Y 20/20  R 20/20  R 20/20  R 2/10  R 2/10   DB E/F          3 2/10 3 2/10  4 2/10  4 2/10  4 2/10  4 2/10                                                                             Comp         Y 3' Y 3' Y3' Y3'    Donuts               Y 2'  Y 2'  Y2'  Y2'   Modalities                       US 15 12 15 15 15 15 dc dc dc dc   CP 15 15 DEF DEF   -- ----    DEF                      "

## 2023-05-11 ENCOUNTER — OFFICE VISIT (OUTPATIENT)
Dept: PHYSICAL THERAPY | Facility: CLINIC | Age: 53
End: 2023-05-11

## 2023-05-11 DIAGNOSIS — M77.8 LEFT HAND TENDONITIS: Primary | ICD-10-CM

## 2023-05-11 NOTE — PROGRESS NOTES
Daily Note     Today's date: 2023  Patient name: Desire Rudd  : 1970  MRN: [id number]  Referring provider: Sara Muse MD  Dx:   Encounter Diagnosis     ICD-10-CM    1  Left hand tendonitis  M77 8                      Subjective: pt feels her pain level has been 0-1 over the past week  Objective: See treatment diary below    AROM left wrist E/F- 60/60- limited by pain                    Thumb MP- 0/65; IP- 0/75; opp- 0 cm  Strength-  II- L/R- 55/45; 3 SANDEEP- 7/; Key-   Circumference at wrist- 14 5 cm  Pt wearing a SA wrist orthosis when active    Assessment: Tolerated treatment well  Patient would benefit from continued PT      Plan: Continue per plan of care        Precautions: avoid provocative activity, position    Manuals             STM 15 12 15 15 15 15  15  15  15  15                           CMC comfort       Med prev   m                                   Neuro Re-Ed                       HP/pulsed biph 15 15 15 15 15 15  15  15  15  15                                                   Ther Ex                        TP   Y 2'      P 2'  P 2'  p2'  Y 2'  Y 2'  Y2'  Y2'    TP 5fingerpull  Y 2/10      P2/10  P 2/10  P2/10  Y 2/10  Y 2/10  Y 2/10  Y 2/10    Varigrip E/F  100% 2/10      0% 2/10  0% 2/10  50% 2/10  100% 2/10  100% 2/10  100% /10  100% /10   TP nibs  Y 13     T13x  T 13x T13x  T 13  T 13  YY13  Y13x   Flexbar  R 2/10     Y 20/20 Y 20/20 Y 20/20  R 20/20  R 20/20  R 2/10  R 2/10   DB E/F  4 2/10        3 2/10 3 2/10  4 2/10  4 2/10  4 2/10  4 2/10                                                                             Comp  Y 3'       Y 3' Y 3' Y3' Y3'    Donuts  Y 2'             Y 2'  Y 2'  Y2'  Y2'   Modalities

## 2023-05-16 ENCOUNTER — OFFICE VISIT (OUTPATIENT)
Dept: PHYSICAL THERAPY | Facility: CLINIC | Age: 53
End: 2023-05-16

## 2023-05-16 DIAGNOSIS — M77.8 LEFT HAND TENDONITIS: Primary | ICD-10-CM

## 2023-05-16 NOTE — PROGRESS NOTES
PT Discharge    Today's date: 2023  Patient name: Breezy Douglas  : 1970  MRN: [id number]  Referring provider: Jazmin Alvarez MD  Dx:   Encounter Diagnosis     ICD-10-CM    1  Left hand tendonitis  M77 8                      Assessment  Assessment details: Pt is a 47 YO female presenting to PT with pain, decreased AROM, strength and tolerance to activity  Pt would benefit from skilled intervention to address these issues and maximize overall function  Occupation- [employer] employee, full time, currently working  Dominant- right; Involved- left  Pt notes reduced pain following therapy, but increased activity level increases radial thumb pain  She is wearing a comfort cool spica during the daytime and has notes fewer episodes of pain/soreness as she becomes stronger  Pt will transition to a HEP    Goals  ST  Decrease pain to 0-2/10 in 4 weeks            2  Decrease swelling left thumb and wrist            3  Increase AROM to [facility] in 6-8 weeks            4   Provide orthotic for protection  LT  Increase functional motion and strength for independence with ADL and self care by DC            2   Ability to RT recreational activity by DC  Goals met    Plan  Patient would benefit from: skilled physical therapy  Planned modality interventions: cryotherapy, ultrasound and thermotherapy: hydrocollator packs  Planned therapy interventions: activity modification, neuromuscular re-education, manual therapy, therapeutic activities, stretching, therapeutic exercise, strengthening and home exercise program  Frequency: 2x week  Duration in weeks: 4  Treatment plan discussed with: patient        Subjective Evaluation    History of Present Illness  Mechanism of injury: Progressive pain, inability to use her left hand fully for work, ADL over past 2 months  Pain  Current pain ratin  At best pain ratin  At worst pain ratin    Hand dominance: right    Treatments  Current treatment: physical therapy  Patient Goals  Patient goals for therapy: decreased edema, decreased pain, increased motion, increased strength, independence with ADLs/IADLs and return to sport/leisure activities          Objective     General Comments:      Wrist/Hand Comments  AROM left wrist E/F- 60/60- limited by pain                    Thumb MP- 0/65; IP- 0/75; opp- 0 cm  Strength-  II- L/R- 55/45; 3 SANDEEP- 7/4; Key- 7/6  Circumference at wrist- 14 5 cm  Pt wearing a SA wrist orthosis when active             Precautions: avoid provocative activity, position    Manuals 5/11 5/16                   STM 15 15 15 15 15 15  15  15  15  15                           CMC comfort       Med prev   m                                   Neuro Re-Ed                       HP/pulsed biph 15 15 15 15 15 15  15  15  15  15                                                   Ther Ex                        TP   Y 2'  Y 2'    P 2'  P 2'  p2'  Y 2'  Y 2'  Y2'  Y2'    TP 5fingerpull  Y 2/10  Y 2/10    P2/10  P 2/10  P2/10  Y 2/10  Y 2/10  Y 2/10  Y 2/10    Varigrip E/F  100% 2/10  100%   2/10    0% 2/10  0% 2/10  50% 2/10  100% 2/10  100% 2/10  100% 2/10  100% 2/10   TP nibs  Y 13  Y 13   T13x  T 13x T13x  T 13  T 13  YY13  Y13x   Flexbar  R 2/10  R 2/10   Y 20/20 Y 20/20 Y 20/20  R 20/20  R 20/20  R 2/10  R 2/10   DB E/F  4 2/10  4 2/10      3 2/10 3 2/10  4 2/10  4 2/10  4 2/10  4 2/10                                                                                                   Comp  Y 3'  Y 3'          Y 3' Y 3' Y3' Y3'    Donuts  Y 2'  Y 2'           Y 2'  Y 2'  Y2'  Y2'   Modalities

## 2023-05-18 ENCOUNTER — APPOINTMENT (OUTPATIENT)
Dept: PHYSICAL THERAPY | Facility: CLINIC | Age: 53
End: 2023-05-18
Payer: COMMERCIAL

## 2023-05-23 ENCOUNTER — APPOINTMENT (OUTPATIENT)
Dept: PHYSICAL THERAPY | Facility: CLINIC | Age: 53
End: 2023-05-23
Payer: COMMERCIAL

## 2023-05-25 ENCOUNTER — APPOINTMENT (OUTPATIENT)
Dept: PHYSICAL THERAPY | Facility: CLINIC | Age: 53
End: 2023-05-25
Payer: COMMERCIAL

## 2023-05-30 ENCOUNTER — APPOINTMENT (OUTPATIENT)
Dept: PHYSICAL THERAPY | Facility: CLINIC | Age: 53
End: 2023-05-30
Payer: COMMERCIAL

## 2025-01-04 ENCOUNTER — OFFICE VISIT (OUTPATIENT)
Dept: URGENT CARE | Facility: CLINIC | Age: 55
End: 2025-01-04
Payer: COMMERCIAL

## 2025-01-04 VITALS
RESPIRATION RATE: 18 BRPM | HEART RATE: 72 BPM | HEIGHT: 62 IN | DIASTOLIC BLOOD PRESSURE: 75 MMHG | OXYGEN SATURATION: 99 % | TEMPERATURE: 97.9 F | BODY MASS INDEX: 24.14 KG/M2 | SYSTOLIC BLOOD PRESSURE: 123 MMHG

## 2025-01-04 DIAGNOSIS — J20.9 ACUTE BRONCHITIS, UNSPECIFIED ORGANISM: Primary | ICD-10-CM

## 2025-01-04 PROCEDURE — G0382 LEV 3 HOSP TYPE B ED VISIT: HCPCS

## 2025-01-04 PROCEDURE — S9083 URGENT CARE CENTER GLOBAL: HCPCS

## 2025-01-04 RX ORDER — ALBUTEROL SULFATE 90 UG/1
2 INHALANT RESPIRATORY (INHALATION) EVERY 6 HOURS PRN
Qty: 8.5 G | Refills: 0 | Status: SHIPPED | OUTPATIENT
Start: 2025-01-04

## 2025-01-04 RX ORDER — AZITHROMYCIN 250 MG/1
TABLET, FILM COATED ORAL
Qty: 6 TABLET | Refills: 0 | Status: SHIPPED | OUTPATIENT
Start: 2025-01-04 | End: 2025-01-08

## 2025-01-04 RX ORDER — PREDNISONE 20 MG/1
40 TABLET ORAL DAILY
Qty: 10 TABLET | Refills: 0 | Status: SHIPPED | OUTPATIENT
Start: 2025-01-04 | End: 2025-01-09

## 2025-01-04 RX ORDER — BROMPHENIRAMINE MALEATE, PSEUDOEPHEDRINE HYDROCHLORIDE, AND DEXTROMETHORPHAN HYDROBROMIDE 2; 30; 10 MG/5ML; MG/5ML; MG/5ML
10 SYRUP ORAL 4 TIMES DAILY PRN
Qty: 240 ML | Refills: 0 | Status: SHIPPED | OUTPATIENT
Start: 2025-01-04

## 2025-01-04 NOTE — PROGRESS NOTES
"  [facility]        NAME: Marsha Carlson is a 54 y.o. female  : 1970    MRN: [id number]  DATE: 2025  TIME: 1:07 PM    Assessment and Plan   Acute bronchitis, unspecified organism [J20.9]  1. Acute bronchitis, unspecified organism  azithromycin (ZITHROMAX) 250 mg tablet    predniSONE 20 mg tablet    albuterol (ProAir HFA) 90 mcg/act inhaler    brompheniramine-pseudoephedrine-DM 30-2-10 MG/5ML syrup            Patient Instructions       Follow up with PCP in 3-5 days.  Proceed to  ER if symptoms worsen.    If tests are performed, our office will contact you with results only if changes need to made to the care plan discussed with you at the visit. [facility]    Chief Complaint     Chief Complaint   Patient presents with    Cough     Started last Wednesday    Fatigue     And weakness    Cold Like Symptoms     Runny nose  Greenish/yellow drainage    Back Pain     Mid thoracic area    Earache     Right ear         History of Present Illness       54-year-old female patient with significant past medical history presents with complaint of cough, fatigue, runny nose with thick yellow-green drainage, right-sided earache, and pain in the middle of her back which has now subsided.  Patient symptoms started \"last Wednesday.\" (24).  Patient reports her boss was recently sick and diagnosed with bronchitis and came to work while being sick.        Review of Systems   Review of Systems   Constitutional:  Positive for fatigue.   HENT:  Positive for ear pain and rhinorrhea.    Respiratory:  Positive for cough.          Current Medications       Current Outpatient Medications:     albuterol (ProAir HFA) 90 mcg/act inhaler, Inhale 2 puffs every 6 (six) hours as needed for wheezing, Disp: 8.5 g, Rfl: 0    azithromycin (ZITHROMAX) 250 mg tablet, Take 2 tablets today then 1 tablet daily x 4 days, Disp: 6 tablet, Rfl: 0    brompheniramine-pseudoephedrine-DM " 30-2-10 MG/5ML syrup, Take 10 mL by mouth 4 (four) times a day as needed for congestion or cough, Disp: 240 mL, Rfl: 0    pantoprazole (PROTONIX) 40 mg tablet, Take 40 mg by mouth daily, Disp: , Rfl:     predniSONE 20 mg tablet, Take 2 tablets (40 mg total) by mouth daily for 5 days, Disp: 10 tablet, Rfl: 0    amitriptyline (ELAVIL) 75 mg tablet, Take 75 mg by mouth every evening (Patient not taking: Reported on 1/4/2025), Disp: , Rfl:     Cholecalciferol 25 MCG (1000 UT) capsule, Take 1,000 Units by mouth daily (Patient not taking: Reported on 1/4/2025), Disp: , Rfl:     cyanocobalamin (VITAMIN B-12) 1000 MCG tablet, Take by mouth (Patient not taking: Reported on 1/4/2025), Disp: , Rfl:     fremanezumab-vfrm (Ajovy) 225 MG/1.5ML prefilled syringe, Inject 675 mg under the skin (Patient not taking: Reported on 1/4/2025), Disp: , Rfl:     multivitamin (THERAGRAN) TABS, Take 1 tablet by mouth daily (Patient not taking: Reported on 1/4/2025), Disp: , Rfl:     Rimegepant Sulfate (Nurtec) 75 MG TBDP, Take 75 mg by mouth (Patient not taking: Reported on 1/4/2025), Disp: , Rfl:     sucralfate (CARAFATE) 1 g tablet, Take 1 g by mouth 4 (four) times a day (Patient not taking: Reported on 1/4/2025), Disp: , Rfl:     Current Allergies     Allergies as of 01/04/2025 - Reviewed 01/04/2025   Allergen Reaction Noted    Cefaclor Hives 03/22/2005    Cephalexin Hives 03/22/2005    Penicillins Hives 03/22/2005            The following portions of the patient's history were reviewed and updated as appropriate: allergies, current medications, past family history, past medical history, past social history, past surgical history and problem list.     Past Medical History:   Diagnosis Date    GERD (gastroesophageal reflux disease)     Migraine        Past Surgical History:   Procedure Laterality Date    ARTHROSCOPIC REPAIR ACL Right     CHOLECYSTECTOMY      TUBAL LIGATION N/A        History reviewed. No pertinent family  "history.      Medications have been verified.        Objective   /75   Pulse 72   Temp 97.9 °F (36.6 °C)   Resp 18   Ht 5' 2\" (1.575 m)   SpO2 99%   BMI 24.14 kg/m²        Physical Exam     Physical Exam  Vitals and nursing note reviewed.   Constitutional:       Appearance: Normal appearance.   HENT:      Head: Normocephalic and atraumatic.      Right Ear: Ear canal and external ear normal. Tympanic membrane is erythematous.      Left Ear: Ear canal and external ear normal. Tympanic membrane is injected.      Nose: Congestion and rhinorrhea present. Rhinorrhea is purulent.      Right Turbinates: Enlarged.      Left Turbinates: Enlarged.      Right Sinus: No maxillary sinus tenderness or frontal sinus tenderness.      Left Sinus: No maxillary sinus tenderness or frontal sinus tenderness.      Mouth/Throat:      Lips: Pink.      Mouth: Mucous membranes are moist.      Pharynx: Uvula midline. Pharyngeal swelling, posterior oropharyngeal erythema and uvula swelling present.   Eyes:      Extraocular Movements: Extraocular movements intact.      Conjunctiva/sclera: Conjunctivae normal.      Pupils: Pupils are equal, round, and reactive to light.   Cardiovascular:      Rate and Rhythm: Normal rate and regular rhythm.      Pulses: Normal pulses.      Heart sounds: Normal heart sounds.   Pulmonary:      Effort: Pulmonary effort is normal.      Breath sounds: Examination of the right-lower field reveals decreased breath sounds. Examination of the left-lower field reveals decreased breath sounds. Decreased breath sounds present.      Comments: Faint exp wheezes scattered throughout at times   Musculoskeletal:         General: Normal range of motion.      Cervical back: Normal range of motion and neck supple.   Skin:     General: Skin is warm and dry.   Neurological:      Mental Status: She is alert.                   "

## 2025-01-04 NOTE — LETTER
January 4, 2025     Patient: Marsha Carlson  YOB: 1970  Date of Visit: 1/4/2025      To Whom it May Concern:    Marsha Carlson is under my professional care. Marsha was seen in my office on 1/4/2025. Marsha may return to work on 1/6/2025 .    If you have any questions or concerns, please don't hesitate to call.         Sincerely,          JUAN FRANCISCO Santana        CC: No Recipients

## 2025-01-04 NOTE — PATIENT INSTRUCTIONS
Take the abx and steroids each day as ordered until completion     You may take over the counter Tylenol (Acetaminophen) and/or Motrin (Ibuprofen) as needed, as directed on packaging.     If you have worsening symptoms you will need to seek care with the emergency room for further eval and treatment    If not better in about 5 days advise a follow up appt with your family